# Patient Record
Sex: MALE | Race: OTHER | Employment: UNEMPLOYED | URBAN - METROPOLITAN AREA
[De-identification: names, ages, dates, MRNs, and addresses within clinical notes are randomized per-mention and may not be internally consistent; named-entity substitution may affect disease eponyms.]

---

## 2021-12-30 ENCOUNTER — HOSPITAL ENCOUNTER (EMERGENCY)
Age: 41
Discharge: HOME | End: 2021-12-30
Attending: EMERGENCY MEDICINE

## 2021-12-30 VITALS
DIASTOLIC BLOOD PRESSURE: 78 MMHG | SYSTOLIC BLOOD PRESSURE: 125 MMHG | RESPIRATION RATE: 16 BRPM | OXYGEN SATURATION: 98 % | HEART RATE: 90 BPM | TEMPERATURE: 99.5 F

## 2021-12-30 DIAGNOSIS — B34.9 VIRAL SYNDROME: Primary | ICD-10-CM

## 2021-12-30 PROCEDURE — 99281 EMR DPT VST MAYX REQ PHY/QHP: CPT

## 2021-12-30 PROCEDURE — U0005 INFEC AGEN DETEC AMPLI PROBE: HCPCS

## 2021-12-30 RX ORDER — NAPROXEN 500 MG/1
500 TABLET ORAL 2 TIMES DAILY WITH MEALS
Qty: 30 TABLET | Refills: 1 | Status: SHIPPED | OUTPATIENT
Start: 2021-12-30

## 2021-12-30 RX ORDER — UREA 10 %
1 LOTION (ML) TOPICAL DAILY
Qty: 30 TABLET | Refills: 0 | Status: SHIPPED | OUTPATIENT
Start: 2021-12-30

## 2021-12-30 RX ORDER — ASCORBIC ACID 500 MG
500 TABLET ORAL 2 TIMES DAILY
Qty: 30 TABLET | Refills: 0 | Status: SHIPPED | OUTPATIENT
Start: 2021-12-30

## 2021-12-30 RX ORDER — ACETAMINOPHEN 325 MG/1
650 TABLET ORAL
Qty: 20 TABLET | Refills: 0 | Status: SHIPPED | OUTPATIENT
Start: 2021-12-30

## 2021-12-30 NOTE — Clinical Note
Clement Fink  [facility] EMERGENCY DEPT  [address] [id number]  [phone]    Work/School Note    Date: 12/30/2021     To Whom It May concern:    Brando Gomez was evaluated by the following provider(s):  Attending Provider: Arvin Maki [address] [place] [address] virus is suspected. Per the CDC guidelines we recommend home isolation until the following conditions are all met:    1. At least five days have passed since symptoms first appeared and/or had a close exposure,   2. After home isolation for five days, wearing a mask around others for the next five days,  3. At least 24 have passed since last fever without the use of fever-reducing medications and  4.  Symptoms (eg cough, shortness of breath) have improved      Sincerely,          Terence Egan MD

## 2021-12-30 NOTE — Clinical Note
[address]  [facility] EMERGENCY DEPT  [address] [id number]  [phone]    Work/School Note    Date: 12/30/2021     To Whom It May concern:    Hai Araya was evaluated by the following provider(s):  Attending Provider: Manuela Licea [address] [place] [address] virus is suspected. Per the CDC guidelines we recommend home isolation until the following conditions are all met:    1. At least five days have passed since symptoms first appeared and/or had a close exposure,   2. After home isolation for five days, wearing a mask around others for the next five days,  3. At least 24 have passed since last fever without the use of fever-reducing medications and  4.  Symptoms (eg cough, shortness of breath) have improved      Sincerely,          Neha Hartley RN

## 2021-12-30 NOTE — DISCHARGE INSTRUCTIONS
Mammogram normal. Repeat again in 1 year. Please inform   Return to the emergency department for difficulty breathing, oxygen levels below 90% on a pulse oximeter or any other concern.

## 2021-12-30 NOTE — ED TRIAGE NOTES
Pt arrives to the ER for complaints of a possible COVID exposure. Symptoms of a fevers of two days, bodyaches. Denies any shortness of breath and chest pain     Pt is vaccinated against COVID.

## 2022-01-01 LAB
SARS-COV-2, XPLCVT: DETECTED
SOURCE, COVRS: ABNORMAL

## 2022-01-03 NOTE — ED PROVIDER NOTES
Patient is a 52-year-old male with no significant past medical history presents emergency department with 2 days of fever and body ache. He states that he is gotten his first 2 doses of Covid vaccine but not his booster. Does not know any known exposures that thinks he may have contracted Covid. Denies any shortness of breath or chest pain. No past medical history on file. No past surgical history on file. No family history on file. Social History     Socioeconomic History    Marital status:      Spouse name: Not on file    Number of children: Not on file    Years of education: Not on file    Highest education level: Not on file   Occupational History    Not on file   Tobacco Use    Smoking status: Not on file    Smokeless tobacco: Not on file   Substance and Sexual Activity    Alcohol use: Not on file    Drug use: Not on file    Sexual activity: Not on file   Other Topics Concern    Not on file   Social History Narrative    Not on file     Social Determinants of Health     Financial Resource Strain:     Difficulty of Paying Living Expenses: Not on file   Food Insecurity:     Worried About Running Out of Food in the Last Year: Not on file    Filiberto of Food in the Last Year: Not on file   Transportation Needs:     Lack of Transportation (Medical): Not on file    Lack of Transportation (Non-Medical):  Not on file   Physical Activity:     Days of Exercise per Week: Not on file    Minutes of Exercise per Session: Not on file   Stress:     Feeling of Stress : Not on file   Social Connections:     Frequency of Communication with Friends and Family: Not on file    Frequency of Social Gatherings with Friends and Family: Not on file    Attends Islam Services: Not on file    Active Member of Clubs or Organizations: Not on file    Attends Club or Organization Meetings: Not on file    Marital Status: Not on file   Intimate Partner Violence:     Fear of Current or Ex-Partner: Not on file    Emotionally Abused: Not on file    Physically Abused: Not on file    Sexually Abused: Not on file   Housing Stability:     Unable to Pay for Housing in the Last Year: Not on file    Number of Places Lived in the Last Year: Not on file    Unstable Housing in the Last Year: Not on file         ALLERGIES: Patient has no known allergies. Review of Systems   Constitutional: Positive for chills, fatigue and fever. HENT: Negative for drooling. Respiratory: Negative for shortness of breath. Cardiovascular: Negative for chest pain. Musculoskeletal: Positive for myalgias. Negative for neck pain and neck stiffness. Skin: Negative for rash and wound. Neurological: Negative for seizures and syncope. Psychiatric/Behavioral: Negative. Vitals:    12/30/21 1606   BP: 125/78   Pulse: 90   Resp: 16   Temp: 99.5 °F (37.5 °C)   SpO2: 98%            Physical Exam  Vitals reviewed. Constitutional:       General: He is not in acute distress. Appearance: Normal appearance. He is not ill-appearing, toxic-appearing or diaphoretic. HENT:      Head: Normocephalic and atraumatic. Right Ear: External ear normal.      Left Ear: External ear normal.   Eyes:      General: No scleral icterus. Cardiovascular:      Rate and Rhythm: Normal rate. Pulses: Normal pulses. Pulmonary:      Effort: Pulmonary effort is normal.      Breath sounds: Normal breath sounds. Abdominal:      Palpations: Abdomen is soft. Musculoskeletal:      Cervical back: Neck supple. Right lower leg: No edema. Left lower leg: No edema. Skin:     General: Skin is warm and dry. Findings: No rash. Neurological:      Mental Status: He is alert and oriented to person, place, and time. Psychiatric:         Mood and Affect: Mood normal.         Behavior: Behavior normal.         Thought Content:  Thought content normal.         Judgment: Judgment normal.          MDM Procedures

## 2022-01-03 NOTE — PROGRESS NOTES
With [facility] #[id number]  Attempted to reach patient concerning lab result.  Message left for call back concerning lab result

## 2024-06-18 ENCOUNTER — OFFICE VISIT (OUTPATIENT)
Age: 44
End: 2024-06-18

## 2024-06-18 VITALS
HEART RATE: 74 BPM | BODY MASS INDEX: 30.01 KG/M2 | HEIGHT: 68 IN | RESPIRATION RATE: 18 BRPM | TEMPERATURE: 98 F | DIASTOLIC BLOOD PRESSURE: 67 MMHG | WEIGHT: 198 LBS | SYSTOLIC BLOOD PRESSURE: 115 MMHG | OXYGEN SATURATION: 96 %

## 2024-06-18 DIAGNOSIS — Z71.6 ENCOUNTER FOR SMOKING CESSATION COUNSELING: ICD-10-CM

## 2024-06-18 DIAGNOSIS — Z00.00 VISIT FOR WELL MAN HEALTH CHECK: Primary | ICD-10-CM

## 2024-06-18 DIAGNOSIS — Z12.11 ENCOUNTER FOR SCREENING COLONOSCOPY: ICD-10-CM

## 2024-06-18 DIAGNOSIS — N63.11 BREAST LUMP ON RIGHT SIDE AT 10 O'CLOCK POSITION: ICD-10-CM

## 2024-06-18 DIAGNOSIS — S46.001S ROTATOR CUFF INJURY, RIGHT, SEQUELA: ICD-10-CM

## 2024-06-18 PROCEDURE — 99213 OFFICE O/P EST LOW 20 MIN: CPT

## 2024-06-18 PROCEDURE — 99386 PREV VISIT NEW AGE 40-64: CPT

## 2024-06-18 RX ORDER — NICOTINE 21 MG/24HR
1 PATCH, TRANSDERMAL 24 HOURS TRANSDERMAL DAILY
Qty: 14 PATCH | Refills: 0 | Status: SHIPPED | OUTPATIENT
Start: 2024-06-18 | End: 2024-07-02

## 2024-06-18 RX ORDER — CALCIUM CARBONATE 500(1250)
500 TABLET ORAL DAILY
COMMUNITY

## 2024-06-18 RX ORDER — NICOTINE 21 MG/24HR
1 PATCH, TRANSDERMAL 24 HOURS TRANSDERMAL DAILY
Qty: 31 PATCH | Refills: 0 | Status: SHIPPED | OUTPATIENT
Start: 2024-06-18 | End: 2024-07-19

## 2024-06-18 SDOH — ECONOMIC STABILITY: FOOD INSECURITY: WITHIN THE PAST 12 MONTHS, YOU WORRIED THAT YOUR FOOD WOULD RUN OUT BEFORE YOU GOT MONEY TO BUY MORE.: PATIENT DECLINED

## 2024-06-18 SDOH — ECONOMIC STABILITY: HOUSING INSECURITY
IN THE LAST 12 MONTHS, WAS THERE A TIME WHEN YOU DID NOT HAVE A STEADY PLACE TO SLEEP OR SLEPT IN A SHELTER (INCLUDING NOW)?: PATIENT DECLINED

## 2024-06-18 SDOH — ECONOMIC STABILITY: INCOME INSECURITY: HOW HARD IS IT FOR YOU TO PAY FOR THE VERY BASICS LIKE FOOD, HOUSING, MEDICAL CARE, AND HEATING?: PATIENT DECLINED

## 2024-06-18 SDOH — ECONOMIC STABILITY: FOOD INSECURITY: WITHIN THE PAST 12 MONTHS, THE FOOD YOU BOUGHT JUST DIDN'T LAST AND YOU DIDN'T HAVE MONEY TO GET MORE.: PATIENT DECLINED

## 2024-06-18 ASSESSMENT — PATIENT HEALTH QUESTIONNAIRE - PHQ9: DEPRESSION UNABLE TO ASSESS: PT REFUSES

## 2024-06-18 NOTE — PROGRESS NOTES
[address] [place] [address]   Office [phone], Fax [phone]    ROUTINE ANNUAL WELL MALE EXAM  Subjective   Charly Zelaya is a 43 y.o. male who presents to clinic today for annual physical exam.      He would also like to address the following issues:      Lump on breast:  Noticed it first 8 months ago  Believes it has grown  2 maternal aunts who had breast cancer   No nipple discharge    R shoulder pain:  Pt is a  and is currently having muscular and joint pain.   Had an injury to the shoulder when he was a child, may have been told he had a rotator cuff problem      Diet: well-balanced  Exercise: limited  Tobacco use: currently smoking 1 ppd  Alcohol use: drinks on the weekends  Drug use: Denies current drug use    Review of Systems:  Patient otherwise denies fevers, chills, headaches, changes in vision, changes in hearing, chest pain, palpitations, shortness of breath, abdominal pain, nausea, vomiting, diarrhea, constipation, lower extremity edema, and numbness and tingling in extremities.     Preventative care:  18:  Depression screening: PHQ-2 is 0     40:  Diabetes screening: will check today     45:  Colon cancer screening: did have a colonoscopy at around age 38. Does not remember which hospital.        Past Medical History  No past medical history on file.    Allergies  No Known Allergies    Current Medications  Current Outpatient Medications on File Prior to Visit   Medication Sig Dispense Refill    Omega-3 Fatty Acids (OMEGA 3 PO) Take by mouth      calcium carbonate (OSCAL) 500 MG TABS tablet Take 1 tablet by mouth daily       No current facility-administered medications on file prior to visit.       Surgical History  No past surgical history on file.    Social History  Social History     Socioeconomic History    Marital status:      Spouse name: Not on file    Number of children: Not on file    Years of education: Not on file    Highest education

## 2024-06-18 NOTE — PROGRESS NOTES
Session Code: 25504    ID #: [id number]   Name: Odin Pickard is a  and is currently having muscular and joint pain. Pt also has a a bump on the right side of his chest. The bump has been there for about 8 months but was not as big and was not painful as it is now.    Identified pt with two pt identifiers(name and ). Reviewed record in preparation for visit and have obtained necessary documentation.  Chief Complaint   Patient presents with    Establish Care        Vitals:    24 1424   BP: 115/67   Site: Left Upper Arm   Position: Sitting   Cuff Size: Medium Adult   Pulse: 74   Resp: 18   Temp: 98 °F (36.7 °C)   TempSrc: Oral   SpO2: 96%   Weight: 89.8 kg (198 lb)   Height: 1.727 m (5' 8\")         Coordination of Care Questionnaire:  :     \"Have you been to the ER, urgent care clinic since your last visit?  Hospitalized since your last visit?\"    NO    “Have you seen or consulted any other health care providers outside of [facility] since your last visit?”    NO        “Have you had a colorectal cancer screening such as a colonoscopy/FIT/Cologuard?    NO    No colonoscopy on file  No cologuard on file  No FIT/FOBT on file   No flexible sigmoidoscopy on file         Click Here for Release of Records Request

## 2024-06-19 LAB
ALBUMIN SERPL-MCNC: 4.2 G/DL (ref 3.5–5)
ALBUMIN/GLOB SERPL: 1.4 (ref 1.1–2.2)
ALP SERPL-CCNC: 126 U/L (ref 45–117)
ALT SERPL-CCNC: 34 U/L (ref 12–78)
BILIRUB SERPL-MCNC: 0.4 MG/DL (ref 0.2–1)
BUN SERPL-MCNC: 15 MG/DL (ref 6–20)
BUN/CREAT SERPL: 17 (ref 12–20)
CALCIUM SERPL-MCNC: 9.8 MG/DL (ref 8.5–10.1)
CHLORIDE SERPL-SCNC: 108 MMOL/L (ref 97–108)
CHOLEST SERPL-MCNC: 230 MG/DL
CO2 SERPL-SCNC: 26 MMOL/L (ref 21–32)
CREAT SERPL-MCNC: 0.89 MG/DL (ref 0.7–1.3)
EST. AVERAGE GLUCOSE BLD GHB EST-MCNC: 105 MG/DL
GLOBULIN SER CALC-MCNC: 3 G/DL (ref 2–4)
GLUCOSE SERPL-MCNC: 89 MG/DL (ref 65–100)
HBA1C MFR BLD: 5.3 % (ref 4–5.6)
HCT VFR BLD AUTO: 50 % (ref 36.6–50.3)
HDLC SERPL-MCNC: 34 MG/DL
HDLC SERPL: 6.8 (ref 0–5)
HGB BLD-MCNC: 16.3 G/DL (ref 12.1–17)
LDLC SERPL CALC-MCNC: 165.2 MG/DL (ref 0–100)
MCH RBC QN AUTO: 30.2 PG (ref 26–34)
MCHC RBC AUTO-ENTMCNC: 32.6 G/DL (ref 30–36.5)
MCV RBC AUTO: 92.6 FL (ref 80–99)
NRBC # BLD: 0 K/UL (ref 0–0.01)
NRBC BLD-RTO: 0 PER 100 WBC
PLATELET # BLD AUTO: 286 K/UL (ref 150–400)
PMV BLD AUTO: 10.3 FL (ref 8.9–12.9)
POTASSIUM SERPL-SCNC: 4.5 MMOL/L (ref 3.5–5.1)
RBC # BLD AUTO: 5.4 M/UL (ref 4.1–5.7)
SODIUM SERPL-SCNC: 138 MMOL/L (ref 136–145)
TRIGL SERPL-MCNC: 154 MG/DL

## 2024-06-26 DIAGNOSIS — E78.5 DYSLIPIDEMIA: Primary | ICD-10-CM

## 2024-06-26 RX ORDER — ATORVASTATIN CALCIUM 10 MG/1
10 TABLET, FILM COATED ORAL DAILY
Qty: 90 TABLET | Refills: 0 | Status: SHIPPED | OUTPATIENT
Start: 2024-06-26

## 2024-09-23 ENCOUNTER — OFFICE VISIT (OUTPATIENT)
Age: 44
End: 2024-09-23

## 2024-09-23 VITALS
HEART RATE: 63 BPM | BODY MASS INDEX: 31.61 KG/M2 | TEMPERATURE: 98.5 F | SYSTOLIC BLOOD PRESSURE: 113 MMHG | OXYGEN SATURATION: 97 % | DIASTOLIC BLOOD PRESSURE: 74 MMHG | WEIGHT: 208.6 LBS | HEIGHT: 68 IN

## 2024-09-23 DIAGNOSIS — A63.0 CONDYLOMATA ACUMINATA: ICD-10-CM

## 2024-09-23 DIAGNOSIS — Z59.89 UNINSURED: Primary | ICD-10-CM

## 2024-09-23 DIAGNOSIS — R74.8 ELEVATED ALKALINE PHOSPHATASE LEVEL: ICD-10-CM

## 2024-09-23 DIAGNOSIS — E78.2 MIXED HYPERLIPIDEMIA: Primary | ICD-10-CM

## 2024-09-23 DIAGNOSIS — S46.001S ROTATOR CUFF INJURY, RIGHT, SEQUELA: ICD-10-CM

## 2024-09-23 LAB
ALBUMIN SERPL-MCNC: 4 G/DL (ref 3.5–5)
ALBUMIN/GLOB SERPL: 1.3 (ref 1.1–2.2)
ALP SERPL-CCNC: 126 U/L (ref 45–117)
ALT SERPL-CCNC: 40 U/L (ref 12–78)
AST SERPL-CCNC: 18 U/L (ref 15–37)
BILIRUB DIRECT SERPL-MCNC: 0.1 MG/DL (ref 0–0.2)
BILIRUB SERPL-MCNC: 0.3 MG/DL (ref 0.2–1)
CHOLEST SERPL-MCNC: 207 MG/DL
GLOBULIN SER CALC-MCNC: 3.2 G/DL (ref 2–4)
HDLC SERPL-MCNC: 33 MG/DL
HDLC SERPL: 6.3 (ref 0–5)
LDLC SERPL CALC-MCNC: 145.8 MG/DL (ref 0–100)
PROT SERPL-MCNC: 7.2 G/DL (ref 6.4–8.2)
TRIGL SERPL-MCNC: 141 MG/DL
VLDLC SERPL CALC-MCNC: 28.2 MG/DL

## 2024-09-23 PROCEDURE — 99214 OFFICE O/P EST MOD 30 MIN: CPT

## 2024-09-23 RX ORDER — IMIQUIMOD 12.5 MG/.25G
CREAM TOPICAL
Qty: 24 EACH | Refills: 0 | Status: SHIPPED | OUTPATIENT
Start: 2024-09-23

## 2024-09-23 SDOH — ECONOMIC STABILITY - INCOME SECURITY: OTHER PROBLEMS RELATED TO HOUSING AND ECONOMIC CIRCUMSTANCES: Z59.89

## 2024-09-23 ASSESSMENT — PATIENT HEALTH QUESTIONNAIRE - PHQ9
SUM OF ALL RESPONSES TO PHQ QUESTIONS 1-9: 0
1. LITTLE INTEREST OR PLEASURE IN DOING THINGS: NOT AT ALL
SUM OF ALL RESPONSES TO PHQ9 QUESTIONS 1 & 2: 0
2. FEELING DOWN, DEPRESSED OR HOPELESS: NOT AT ALL
SUM OF ALL RESPONSES TO PHQ QUESTIONS 1-9: 0

## 2024-10-18 ENCOUNTER — HOSPITAL ENCOUNTER (OUTPATIENT)
Facility: HOSPITAL | Age: 44
Discharge: HOME | End: 2024-10-21

## 2024-10-18 VITALS — BODY MASS INDEX: 31.52 KG/M2 | HEIGHT: 68 IN | WEIGHT: 208 LBS

## 2024-10-18 DIAGNOSIS — N63.11 BREAST LUMP ON RIGHT SIDE AT 10 O'CLOCK POSITION: ICD-10-CM

## 2024-10-18 DIAGNOSIS — N63.10 MASS OF RIGHT BREAST, UNSPECIFIED QUADRANT: ICD-10-CM

## 2024-10-18 PROCEDURE — 76642 ULTRASOUND BREAST LIMITED: CPT

## 2024-10-18 PROCEDURE — 77066 DX MAMMO INCL CAD BI: CPT

## 2024-11-18 ENCOUNTER — TELEPHONE (OUTPATIENT)
Age: 44
End: 2024-11-18

## 2024-11-18 DIAGNOSIS — D17.9 INTRAMUSCULAR LIPOMA: Primary | ICD-10-CM

## 2024-11-18 DIAGNOSIS — A63.0 CONDYLOMATA ACUMINATA: ICD-10-CM

## 2024-11-18 RX ORDER — IMIQUIMOD 12.5 MG/.25G
CREAM TOPICAL
Qty: 24 EACH | Refills: 0 | Status: SHIPPED | OUTPATIENT
Start: 2024-11-18

## 2024-11-18 NOTE — TELEPHONE ENCOUNTER
----- Message from Dr. Jared Sanchez MD sent at 11/18/2024  2:43 PM EST -----  Regarding: schedule f/u with Laura in about 3-4 months  Already spoke to the patient about results and following up so he should now that I want to see him back in this timeframe.  He needs .

## 2024-12-03 ENCOUNTER — OFFICE VISIT (OUTPATIENT)
Age: 44
End: 2024-12-03

## 2024-12-03 ENCOUNTER — SOCIAL WORK (OUTPATIENT)
Age: 44
End: 2024-12-03

## 2024-12-03 VITALS — BODY MASS INDEX: 31.25 KG/M2 | WEIGHT: 211 LBS | HEIGHT: 69 IN

## 2024-12-03 DIAGNOSIS — R22.2 MASS OF CHEST WALL, RIGHT: Primary | ICD-10-CM

## 2024-12-03 PROCEDURE — 99202 OFFICE O/P NEW SF 15 MIN: CPT | Performed by: SURGERY

## 2024-12-03 NOTE — PROGRESS NOTES
Referral rec'd from [facility]  was used (see provider note for  info).     Pt presented to clinic this morning with a right breast mass that he plans to undergo surgery to remove. Pt does not having insurance coverage and would like to obtain insurance or BSFAA prior to getting surgery.     Pt is unsure if he has benefits through his employer-- he works as a . ISAIAS explained the BSFAA program with the pt. He was unsure about applying as he just rec'd his SSN and does not have proof of income. Asked the pt if he would be willing to ask his employer for his pay stubs from the past 3 months to accompany his application. He plans to do this.     ISAIAS encouraged the pt to contact the insurance eligibility vendor prior to submitting his BSFAA to see what kind of insurance he can qualify for, then he can apply for BSFAA to help cover costs that insurance does not cover. Explained that SW can send his completed BSFAA to a financial counselor or he can mail the luis to the address listed on the luis. He prefers to bring the documents back to our office for SW to submit. Encouraged the pt to contact this writer with any questions. Provided pt with the phone number for the insurance eligibility vendor, a blank copy of BSFAA, and  business card with contact information.     Referral/Handouts:       Insurance/Entitlements referral  Financial/Medication assistance referral       Plan:   Provide ongoing psychosocial support as desired by the patient.   SW remains available for further support and assistance.        REGINA Morin  [facility]     [facility], [facility], & [facility]  W: [phone]  [email or web address]   Good Help to Those in Need®

## 2024-12-03 NOTE — PROGRESS NOTES
operative expectations:  a. Outpatient surgery with sedation.  b. Surgery will take about 45 minutes, then an hour in the recovery room.  c. The wound is closed with dissolving sutures and steristrips.  It is okay to shower after surgery.  d. Swelling and bruising are normal and can last up to 2 weeks.  e.  May resume normal activities the day after surgery, but may have 1 week of pain and/or fatigue  f. Pain is usually well-controlled with ibuprofen or acetaminophen.   g. Infection and bleeding are unlikely but possible complications.

## 2024-12-19 ENCOUNTER — TELEPHONE (OUTPATIENT)
Age: 44
End: 2024-12-19

## 2024-12-19 NOTE — TELEPHONE ENCOUNTER
Called the patient to offer assistance with navigating either his BSFAA or applying for insurance. He shared that he and his wife were able to get insurance coverage that will begin in the new year. He plans to call the office to schedule surgery when he gets confirmation that his insurance is active. He was appreciative for the call. Encouraged him to reach out with any questions.      REGINA Morin  [facility]     [facility], [facility], & [facility]  W: [phone]  [email or web address]   Good Help to Those in Need®

## 2025-02-24 ENCOUNTER — TELEPHONE (OUTPATIENT)
Age: 45
End: 2025-02-24

## 2025-02-24 NOTE — TELEPHONE ENCOUNTER
ISAIAS reached out to patient regarding notice received from DSS regarding Medicaid application. Patient advised that income verification is needed. Patient agreed to drop off information and also states that he purchased medical insurance. Patient understands that failure to provide requested verification will result in denial.    А[facility]на [facility]ala [facility]   Navigator